# Patient Record
Sex: FEMALE | Race: BLACK OR AFRICAN AMERICAN | NOT HISPANIC OR LATINO | URBAN - METROPOLITAN AREA
[De-identification: names, ages, dates, MRNs, and addresses within clinical notes are randomized per-mention and may not be internally consistent; named-entity substitution may affect disease eponyms.]

---

## 2018-07-16 ENCOUNTER — OUTPATIENT (OUTPATIENT)
Dept: OUTPATIENT SERVICES | Facility: HOSPITAL | Age: 33
LOS: 1 days | End: 2018-07-16

## 2018-07-16 DIAGNOSIS — O26.899 OTHER SPECIFIED PREGNANCY RELATED CONDITIONS, UNSPECIFIED TRIMESTER: ICD-10-CM

## 2018-07-16 DIAGNOSIS — Z3A.00 WEEKS OF GESTATION OF PREGNANCY NOT SPECIFIED: ICD-10-CM

## 2018-07-17 ENCOUNTER — TRANSCRIPTION ENCOUNTER (OUTPATIENT)
Age: 33
End: 2018-07-17

## 2018-07-17 VITALS — WEIGHT: 209.44 LBS | HEIGHT: 65 IN

## 2018-07-17 PROBLEM — Z00.00 ENCOUNTER FOR PREVENTIVE HEALTH EXAMINATION: Status: ACTIVE | Noted: 2018-07-17

## 2018-07-17 LAB
BASOPHILS # BLD AUTO: 0.02 K/UL — SIGNIFICANT CHANGE UP (ref 0–0.2)
BASOPHILS NFR BLD AUTO: 0.3 % — SIGNIFICANT CHANGE UP (ref 0–2)
BLD GP AB SCN SERPL QL: NEGATIVE — SIGNIFICANT CHANGE UP
EOSINOPHIL # BLD AUTO: 0.09 K/UL — SIGNIFICANT CHANGE UP (ref 0–0.5)
EOSINOPHIL NFR BLD AUTO: 1.3 % — SIGNIFICANT CHANGE UP (ref 0–6)
HBV SURFACE AG SER-ACNC: NEGATIVE — SIGNIFICANT CHANGE UP
HCT VFR BLD CALC: 33.9 % — LOW (ref 34.5–45)
HGB BLD-MCNC: 10.9 G/DL — LOW (ref 11.5–15.5)
IMM GRANULOCYTES # BLD AUTO: 0.05 # — SIGNIFICANT CHANGE UP
IMM GRANULOCYTES NFR BLD AUTO: 0.7 % — SIGNIFICANT CHANGE UP (ref 0–1.5)
LYMPHOCYTES # BLD AUTO: 1.57 K/UL — SIGNIFICANT CHANGE UP (ref 1–3.3)
LYMPHOCYTES # BLD AUTO: 22.9 % — SIGNIFICANT CHANGE UP (ref 13–44)
MCHC RBC-ENTMCNC: 27.8 PG — SIGNIFICANT CHANGE UP (ref 27–34)
MCHC RBC-ENTMCNC: 32.2 % — SIGNIFICANT CHANGE UP (ref 32–36)
MCV RBC AUTO: 86.5 FL — SIGNIFICANT CHANGE UP (ref 80–100)
MONOCYTES # BLD AUTO: 0.66 K/UL — SIGNIFICANT CHANGE UP (ref 0–0.9)
MONOCYTES NFR BLD AUTO: 9.6 % — SIGNIFICANT CHANGE UP (ref 2–14)
NEUTROPHILS # BLD AUTO: 4.47 K/UL — SIGNIFICANT CHANGE UP (ref 1.8–7.4)
NEUTROPHILS NFR BLD AUTO: 65.2 % — SIGNIFICANT CHANGE UP (ref 43–77)
NRBC # FLD: 0 — SIGNIFICANT CHANGE UP
PLATELET # BLD AUTO: 154 K/UL — SIGNIFICANT CHANGE UP (ref 150–400)
PMV BLD: 10.8 FL — SIGNIFICANT CHANGE UP (ref 7–13)
RBC # BLD: 3.92 M/UL — SIGNIFICANT CHANGE UP (ref 3.8–5.2)
RBC # FLD: 13.3 % — SIGNIFICANT CHANGE UP (ref 10.3–14.5)
RH IG SCN BLD-IMP: POSITIVE — SIGNIFICANT CHANGE UP
RH IG SCN BLD-IMP: POSITIVE — SIGNIFICANT CHANGE UP
RUBV IGG SER-ACNC: 2.6 INDEX — SIGNIFICANT CHANGE UP
RUBV IGG SER-IMP: POSITIVE — SIGNIFICANT CHANGE UP
T PALLIDUM AB TITR SER: NEGATIVE — SIGNIFICANT CHANGE UP
WBC # BLD: 6.86 K/UL — SIGNIFICANT CHANGE UP (ref 3.8–10.5)
WBC # FLD AUTO: 6.86 K/UL — SIGNIFICANT CHANGE UP (ref 3.8–10.5)

## 2018-07-17 RX ORDER — METOCLOPRAMIDE HCL 10 MG
10 TABLET ORAL ONCE
Qty: 0 | Refills: 0 | Status: DISCONTINUED | OUTPATIENT
Start: 2018-07-17 | End: 2018-07-17

## 2018-07-17 RX ORDER — CITRIC ACID/SODIUM CITRATE 300-500 MG
30 SOLUTION, ORAL ORAL ONCE
Qty: 0 | Refills: 0 | Status: DISCONTINUED | OUTPATIENT
Start: 2018-07-17 | End: 2018-07-17

## 2018-07-17 RX ORDER — FAMOTIDINE 10 MG/ML
20 INJECTION INTRAVENOUS ONCE
Qty: 0 | Refills: 0 | Status: DISCONTINUED | OUTPATIENT
Start: 2018-07-17 | End: 2018-07-17

## 2018-07-17 RX ORDER — SODIUM CHLORIDE 9 MG/ML
1000 INJECTION, SOLUTION INTRAVENOUS ONCE
Qty: 0 | Refills: 0 | Status: DISCONTINUED | OUTPATIENT
Start: 2018-07-17 | End: 2018-07-17

## 2018-07-17 RX ORDER — SODIUM CHLORIDE 9 MG/ML
1000 INJECTION, SOLUTION INTRAVENOUS
Qty: 0 | Refills: 0 | Status: DISCONTINUED | OUTPATIENT
Start: 2018-07-17 | End: 2018-07-17

## 2018-07-17 RX ADMIN — SODIUM CHLORIDE 125 MILLILITER(S): 9 INJECTION, SOLUTION INTRAVENOUS at 05:00

## 2018-07-17 NOTE — DISCHARGE NOTE ANTEPARTUM - PLAN OF CARE
Healthy pregnancy Resume all regular diet and activities as tolerated. Please return to labor and delivery triage area if you have contractions, vaginal bleeding, leakage of fluid, decreased fetal movements, fevers, chills, nausea, vomiting, or any other symptoms that are concerning to you.     Please follow up in the ATU on the 4th floor of the [facility] for an NST and an ultrasound BPP on 7/18/18 @ 1PM.

## 2018-07-17 NOTE — DISCHARGE NOTE ANTEPARTUM - PATIENT PORTAL LINK FT
You can access the [facility] Patient Portal, offered by [facility], by registering with the following website: [email or web address]

## 2018-07-17 NOTE — DISCHARGE NOTE ANTEPARTUM - CARE PROVIDER_API CALL
Tati Sawyer (MD), Obstetrics and Gynecology  [address] [place] [address]  Phone: [phone]  Fax: [phone]

## 2018-07-17 NOTE — DISCHARGE NOTE ANTEPARTUM - CARE PLAN
Principal Discharge DX:	Pregnancy  Goal:	Healthy pregnancy  Assessment and plan of treatment:	Resume all regular diet and activities as tolerated. Please return to labor and delivery triage area if you have contractions, vaginal bleeding, leakage of fluid, decreased fetal movements, fevers, chills, nausea, vomiting, or any other symptoms that are concerning to you.     Please follow up in the ATU on the 4th floor of the [facility] for an NST and an ultrasound BPP on 7/18/18 @ 1PM.

## 2018-07-17 NOTE — DISCHARGE NOTE ANTEPARTUM - HOSPITAL COURSE
Please follow up in the ATU on 7/18/18 @1pm for an ultrasound.    Tracing approved by Dr. Fleischer for discharge.

## 2018-07-18 ENCOUNTER — RESULT REVIEW (OUTPATIENT)
Age: 33
End: 2018-07-18

## 2018-07-18 ENCOUNTER — INPATIENT (INPATIENT)
Facility: HOSPITAL | Age: 33
LOS: 2 days | Discharge: HOME | End: 2018-07-21
Attending: OBSTETRICS & GYNECOLOGY | Admitting: OBSTETRICS & GYNECOLOGY
Payer: COMMERCIAL

## 2018-07-18 ENCOUNTER — APPOINTMENT (OUTPATIENT)
Dept: ANTEPARTUM | Facility: CLINIC | Age: 33
End: 2018-07-18

## 2018-07-18 ENCOUNTER — APPOINTMENT (OUTPATIENT)
Dept: ANTEPARTUM | Facility: HOSPITAL | Age: 33
End: 2018-07-18

## 2018-07-18 VITALS — WEIGHT: 210.32 LBS | HEIGHT: 65 IN

## 2018-07-18 DIAGNOSIS — Z3A.00 WEEKS OF GESTATION OF PREGNANCY NOT SPECIFIED: ICD-10-CM

## 2018-07-18 DIAGNOSIS — O26.899 OTHER SPECIFIED PREGNANCY RELATED CONDITIONS, UNSPECIFIED TRIMESTER: ICD-10-CM

## 2018-07-18 LAB
BASOPHILS # BLD AUTO: 0.03 K/UL — SIGNIFICANT CHANGE UP (ref 0–0.2)
BASOPHILS NFR BLD AUTO: 0.4 % — SIGNIFICANT CHANGE UP (ref 0–2)
BLD GP AB SCN SERPL QL: NEGATIVE — SIGNIFICANT CHANGE UP
EOSINOPHIL # BLD AUTO: 0.05 K/UL — SIGNIFICANT CHANGE UP (ref 0–0.5)
EOSINOPHIL NFR BLD AUTO: 0.6 % — SIGNIFICANT CHANGE UP (ref 0–6)
HCT VFR BLD CALC: 36.1 % — SIGNIFICANT CHANGE UP (ref 34.5–45)
HGB BLD-MCNC: 11.8 G/DL — SIGNIFICANT CHANGE UP (ref 11.5–15.5)
IMM GRANULOCYTES # BLD AUTO: 0.03 # — SIGNIFICANT CHANGE UP
IMM GRANULOCYTES NFR BLD AUTO: 0.4 % — SIGNIFICANT CHANGE UP (ref 0–1.5)
LYMPHOCYTES # BLD AUTO: 1.53 K/UL — SIGNIFICANT CHANGE UP (ref 1–3.3)
LYMPHOCYTES # BLD AUTO: 18 % — SIGNIFICANT CHANGE UP (ref 13–44)
MCHC RBC-ENTMCNC: 28.1 PG — SIGNIFICANT CHANGE UP (ref 27–34)
MCHC RBC-ENTMCNC: 32.7 % — SIGNIFICANT CHANGE UP (ref 32–36)
MCV RBC AUTO: 86 FL — SIGNIFICANT CHANGE UP (ref 80–100)
MONOCYTES # BLD AUTO: 0.75 K/UL — SIGNIFICANT CHANGE UP (ref 0–0.9)
MONOCYTES NFR BLD AUTO: 8.8 % — SIGNIFICANT CHANGE UP (ref 2–14)
NEUTROPHILS # BLD AUTO: 6.13 K/UL — SIGNIFICANT CHANGE UP (ref 1.8–7.4)
NEUTROPHILS NFR BLD AUTO: 71.8 % — SIGNIFICANT CHANGE UP (ref 43–77)
NRBC # FLD: 0 — SIGNIFICANT CHANGE UP
PLATELET # BLD AUTO: 160 K/UL — SIGNIFICANT CHANGE UP (ref 150–400)
PMV BLD: 10.7 FL — SIGNIFICANT CHANGE UP (ref 7–13)
RBC # BLD: 4.2 M/UL — SIGNIFICANT CHANGE UP (ref 3.8–5.2)
RBC # FLD: 13.3 % — SIGNIFICANT CHANGE UP (ref 10.3–14.5)
RH IG SCN BLD-IMP: POSITIVE — SIGNIFICANT CHANGE UP
WBC # BLD: 8.52 K/UL — SIGNIFICANT CHANGE UP (ref 3.8–10.5)
WBC # FLD AUTO: 8.52 K/UL — SIGNIFICANT CHANGE UP (ref 3.8–10.5)

## 2018-07-18 PROCEDURE — 88307 TISSUE EXAM BY PATHOLOGIST: CPT | Mod: 26

## 2018-07-18 RX ORDER — ONDANSETRON 8 MG/1
4 TABLET, FILM COATED ORAL EVERY 6 HOURS
Qty: 0 | Refills: 0 | Status: DISCONTINUED | OUTPATIENT
Start: 2018-07-19 | End: 2018-07-19

## 2018-07-18 RX ORDER — SODIUM CHLORIDE 9 MG/ML
1000 INJECTION, SOLUTION INTRAVENOUS
Qty: 0 | Refills: 0 | Status: DISCONTINUED | OUTPATIENT
Start: 2018-07-18 | End: 2018-07-19

## 2018-07-18 RX ORDER — OXYCODONE HYDROCHLORIDE 5 MG/1
10 TABLET ORAL
Qty: 0 | Refills: 0 | Status: DISCONTINUED | OUTPATIENT
Start: 2018-07-19 | End: 2018-07-19

## 2018-07-18 RX ORDER — BUTORPHANOL TARTRATE 2 MG/ML
0.12 INJECTION, SOLUTION INTRAMUSCULAR; INTRAVENOUS EVERY 6 HOURS
Qty: 0 | Refills: 0 | Status: DISCONTINUED | OUTPATIENT
Start: 2018-07-19 | End: 2018-07-19

## 2018-07-18 RX ORDER — SODIUM CHLORIDE 9 MG/ML
1000 INJECTION, SOLUTION INTRAVENOUS
Qty: 0 | Refills: 0 | Status: DISCONTINUED | OUTPATIENT
Start: 2018-07-18 | End: 2018-07-18

## 2018-07-18 RX ORDER — OXYTOCIN 10 UNIT/ML
333.33 VIAL (ML) INJECTION
Qty: 20 | Refills: 0 | Status: DISCONTINUED | OUTPATIENT
Start: 2018-07-18 | End: 2018-07-19

## 2018-07-18 RX ORDER — HEPARIN SODIUM 5000 [USP'U]/ML
5000 INJECTION INTRAVENOUS; SUBCUTANEOUS EVERY 12 HOURS
Qty: 0 | Refills: 0 | Status: DISCONTINUED | OUTPATIENT
Start: 2018-07-18 | End: 2018-07-21

## 2018-07-18 RX ORDER — OXYCODONE HYDROCHLORIDE 5 MG/1
5 TABLET ORAL EVERY 4 HOURS
Qty: 0 | Refills: 0 | Status: COMPLETED | OUTPATIENT
Start: 2018-07-18 | End: 2018-07-25

## 2018-07-18 RX ORDER — SODIUM CHLORIDE 9 MG/ML
1000 INJECTION, SOLUTION INTRAVENOUS ONCE
Qty: 0 | Refills: 0 | Status: COMPLETED | OUTPATIENT
Start: 2018-07-18 | End: 2018-07-18

## 2018-07-18 RX ORDER — OXYTOCIN 10 UNIT/ML
333.33 VIAL (ML) INJECTION
Qty: 20 | Refills: 0 | Status: COMPLETED | OUTPATIENT
Start: 2018-07-18

## 2018-07-18 RX ORDER — METOCLOPRAMIDE HCL 10 MG
10 TABLET ORAL ONCE
Qty: 0 | Refills: 0 | Status: COMPLETED | OUTPATIENT
Start: 2018-07-18 | End: 2018-07-18

## 2018-07-18 RX ORDER — CITRIC ACID/SODIUM CITRATE 300-500 MG
30 SOLUTION, ORAL ORAL ONCE
Qty: 0 | Refills: 0 | Status: COMPLETED | OUTPATIENT
Start: 2018-07-18 | End: 2018-07-18

## 2018-07-18 RX ORDER — KETOROLAC TROMETHAMINE 30 MG/ML
30 SYRINGE (ML) INJECTION EVERY 6 HOURS
Qty: 0 | Refills: 0 | Status: DISCONTINUED | OUTPATIENT
Start: 2018-07-18 | End: 2018-07-19

## 2018-07-18 RX ORDER — NALOXONE HYDROCHLORIDE 4 MG/.1ML
0.1 SPRAY NASAL
Qty: 0 | Refills: 0 | Status: DISCONTINUED | OUTPATIENT
Start: 2018-07-19 | End: 2018-07-19

## 2018-07-18 RX ORDER — HYDROMORPHONE HYDROCHLORIDE 2 MG/ML
0.5 INJECTION INTRAMUSCULAR; INTRAVENOUS; SUBCUTANEOUS
Qty: 0 | Refills: 0 | Status: DISCONTINUED | OUTPATIENT
Start: 2018-07-19 | End: 2018-07-19

## 2018-07-18 RX ORDER — SODIUM CHLORIDE 9 MG/ML
1000 INJECTION, SOLUTION INTRAVENOUS ONCE
Qty: 0 | Refills: 0 | Status: DISCONTINUED | OUTPATIENT
Start: 2018-07-18 | End: 2018-07-18

## 2018-07-18 RX ORDER — ACETAMINOPHEN 500 MG
975 TABLET ORAL EVERY 6 HOURS
Qty: 0 | Refills: 0 | Status: DISCONTINUED | OUTPATIENT
Start: 2018-07-18 | End: 2018-07-21

## 2018-07-18 RX ORDER — OXYTOCIN 10 UNIT/ML
333.33 VIAL (ML) INJECTION
Qty: 20 | Refills: 0 | Status: DISCONTINUED | OUTPATIENT
Start: 2018-07-18 | End: 2018-07-18

## 2018-07-18 RX ORDER — IBUPROFEN 200 MG
600 TABLET ORAL EVERY 6 HOURS
Qty: 0 | Refills: 0 | Status: COMPLETED | OUTPATIENT
Start: 2018-07-18 | End: 2019-06-16

## 2018-07-18 RX ORDER — OXYCODONE HYDROCHLORIDE 5 MG/1
5 TABLET ORAL
Qty: 0 | Refills: 0 | Status: DISCONTINUED | OUTPATIENT
Start: 2018-07-19 | End: 2018-07-19

## 2018-07-18 RX ORDER — OXYCODONE HYDROCHLORIDE 5 MG/1
5 TABLET ORAL
Qty: 0 | Refills: 0 | Status: COMPLETED | OUTPATIENT
Start: 2018-07-18 | End: 2018-07-25

## 2018-07-18 RX ORDER — FAMOTIDINE 10 MG/ML
20 INJECTION INTRAVENOUS ONCE
Qty: 0 | Refills: 0 | Status: COMPLETED | OUTPATIENT
Start: 2018-07-18 | End: 2018-07-18

## 2018-07-18 RX ADMIN — SODIUM CHLORIDE 2000 MILLILITER(S): 9 INJECTION, SOLUTION INTRAVENOUS at 15:30

## 2018-07-18 RX ADMIN — FAMOTIDINE 20 MILLIGRAM(S): 10 INJECTION INTRAVENOUS at 18:29

## 2018-07-18 RX ADMIN — SODIUM CHLORIDE 250 MILLILITER(S): 9 INJECTION, SOLUTION INTRAVENOUS at 16:13

## 2018-07-18 RX ADMIN — Medication 30 MILLILITER(S): at 18:30

## 2018-07-18 RX ADMIN — Medication 10 MILLIGRAM(S): at 18:30

## 2018-07-19 ENCOUNTER — TRANSCRIPTION ENCOUNTER (OUTPATIENT)
Age: 33
End: 2018-07-19

## 2018-07-19 DIAGNOSIS — Z34.90 ENCOUNTER FOR SUPERVISION OF NORMAL PREGNANCY, UNSPECIFIED, UNSPECIFIED TRIMESTER: ICD-10-CM

## 2018-07-19 DIAGNOSIS — O42.10 PREMATURE RUPTURE OF MEMBRANES, ONSET OF LABOR MORE THAN 24 HOURS FOLLOWING RUPTURE, UNSPECIFIED WEEKS OF GESTATION: ICD-10-CM

## 2018-07-19 LAB
HCT VFR BLD CALC: 28.9 % — LOW (ref 34.5–45)
HGB BLD-MCNC: 9.5 G/DL — LOW (ref 11.5–15.5)
MCHC RBC-ENTMCNC: 28.6 PG — SIGNIFICANT CHANGE UP (ref 27–34)
MCHC RBC-ENTMCNC: 32.9 % — SIGNIFICANT CHANGE UP (ref 32–36)
MCV RBC AUTO: 87 FL — SIGNIFICANT CHANGE UP (ref 80–100)
NRBC # FLD: 0 — SIGNIFICANT CHANGE UP
PLATELET # BLD AUTO: 113 K/UL — LOW (ref 150–400)
PMV BLD: 10.6 FL — SIGNIFICANT CHANGE UP (ref 7–13)
RBC # BLD: 3.32 M/UL — LOW (ref 3.8–5.2)
RBC # FLD: 13.2 % — SIGNIFICANT CHANGE UP (ref 10.3–14.5)
T PALLIDUM AB TITR SER: NEGATIVE — SIGNIFICANT CHANGE UP
WBC # BLD: 10.73 K/UL — HIGH (ref 3.8–10.5)
WBC # FLD AUTO: 10.73 K/UL — HIGH (ref 3.8–10.5)

## 2018-07-19 RX ORDER — SODIUM CHLORIDE 9 MG/ML
1000 INJECTION, SOLUTION INTRAVENOUS
Qty: 0 | Refills: 0 | Status: DISCONTINUED | OUTPATIENT
Start: 2018-07-19 | End: 2018-07-19

## 2018-07-19 RX ORDER — ONDANSETRON 8 MG/1
4 TABLET, FILM COATED ORAL ONCE
Qty: 0 | Refills: 0 | Status: DISCONTINUED | OUTPATIENT
Start: 2018-07-19 | End: 2018-07-19

## 2018-07-19 RX ORDER — IBUPROFEN 200 MG
1 TABLET ORAL
Qty: 0 | Refills: 0 | COMMUNITY
Start: 2018-07-19

## 2018-07-19 RX ORDER — SIMETHICONE 80 MG/1
80 TABLET, CHEWABLE ORAL EVERY 4 HOURS
Qty: 0 | Refills: 0 | Status: DISCONTINUED | OUTPATIENT
Start: 2018-07-19 | End: 2018-07-21

## 2018-07-19 RX ORDER — GLYCERIN ADULT
1 SUPPOSITORY, RECTAL RECTAL AT BEDTIME
Qty: 0 | Refills: 0 | Status: DISCONTINUED | OUTPATIENT
Start: 2018-07-19 | End: 2018-07-21

## 2018-07-19 RX ORDER — HYDROMORPHONE HYDROCHLORIDE 2 MG/ML
0.5 INJECTION INTRAMUSCULAR; INTRAVENOUS; SUBCUTANEOUS
Qty: 0 | Refills: 0 | Status: DISCONTINUED | OUTPATIENT
Start: 2018-07-19 | End: 2018-07-19

## 2018-07-19 RX ORDER — DIPHENHYDRAMINE HCL 50 MG
25 CAPSULE ORAL EVERY 6 HOURS
Qty: 0 | Refills: 0 | Status: DISCONTINUED | OUTPATIENT
Start: 2018-07-19 | End: 2018-07-21

## 2018-07-19 RX ORDER — IBUPROFEN 200 MG
600 TABLET ORAL EVERY 6 HOURS
Qty: 0 | Refills: 0 | Status: DISCONTINUED | OUTPATIENT
Start: 2018-07-19 | End: 2018-07-21

## 2018-07-19 RX ORDER — DOCUSATE SODIUM 100 MG
100 CAPSULE ORAL
Qty: 0 | Refills: 0 | Status: DISCONTINUED | OUTPATIENT
Start: 2018-07-19 | End: 2018-07-21

## 2018-07-19 RX ORDER — DOCUSATE SODIUM 100 MG
100 CAPSULE ORAL
Qty: 0 | Refills: 0 | Status: DISCONTINUED | OUTPATIENT
Start: 2018-07-19 | End: 2018-07-19

## 2018-07-19 RX ORDER — FERROUS SULFATE 325(65) MG
325 TABLET ORAL THREE TIMES A DAY
Qty: 0 | Refills: 0 | Status: DISCONTINUED | OUTPATIENT
Start: 2018-07-19 | End: 2018-07-21

## 2018-07-19 RX ORDER — FERROUS SULFATE 325(65) MG
325 TABLET ORAL DAILY
Qty: 0 | Refills: 0 | Status: DISCONTINUED | OUTPATIENT
Start: 2018-07-19 | End: 2018-07-19

## 2018-07-19 RX ORDER — TETANUS TOXOID, REDUCED DIPHTHERIA TOXOID AND ACELLULAR PERTUSSIS VACCINE, ADSORBED 5; 2.5; 8; 8; 2.5 [IU]/.5ML; [IU]/.5ML; UG/.5ML; UG/.5ML; UG/.5ML
0.5 SUSPENSION INTRAMUSCULAR ONCE
Qty: 0 | Refills: 0 | Status: COMPLETED | OUTPATIENT
Start: 2018-07-19 | End: 2019-06-17

## 2018-07-19 RX ORDER — LANOLIN
1 OINTMENT (GRAM) TOPICAL
Qty: 0 | Refills: 0 | Status: DISCONTINUED | OUTPATIENT
Start: 2018-07-19 | End: 2018-07-21

## 2018-07-19 RX ORDER — OXYTOCIN 10 UNIT/ML
41.67 VIAL (ML) INJECTION
Qty: 20 | Refills: 0 | Status: DISCONTINUED | OUTPATIENT
Start: 2018-07-19 | End: 2018-07-19

## 2018-07-19 RX ORDER — ASCORBIC ACID 60 MG
500 TABLET,CHEWABLE ORAL DAILY
Qty: 0 | Refills: 0 | Status: DISCONTINUED | OUTPATIENT
Start: 2018-07-19 | End: 2018-07-21

## 2018-07-19 RX ADMIN — Medication 30 MILLIGRAM(S): at 01:28

## 2018-07-19 RX ADMIN — Medication 600 MILLIGRAM(S): at 22:10

## 2018-07-19 RX ADMIN — Medication 975 MILLIGRAM(S): at 01:58

## 2018-07-19 RX ADMIN — Medication 500 MILLIGRAM(S): at 18:42

## 2018-07-19 RX ADMIN — Medication 975 MILLIGRAM(S): at 22:10

## 2018-07-19 RX ADMIN — Medication 30 MILLIGRAM(S): at 01:58

## 2018-07-19 RX ADMIN — HEPARIN SODIUM 5000 UNIT(S): 5000 INJECTION INTRAVENOUS; SUBCUTANEOUS at 18:43

## 2018-07-19 RX ADMIN — HEPARIN SODIUM 5000 UNIT(S): 5000 INJECTION INTRAVENOUS; SUBCUTANEOUS at 06:03

## 2018-07-19 RX ADMIN — Medication 100 MILLIGRAM(S): at 18:42

## 2018-07-19 RX ADMIN — Medication 1 TABLET(S): at 18:43

## 2018-07-19 RX ADMIN — Medication 975 MILLIGRAM(S): at 01:28

## 2018-07-19 RX ADMIN — Medication 325 MILLIGRAM(S): at 18:43

## 2018-07-19 RX ADMIN — Medication 600 MILLIGRAM(S): at 21:41

## 2018-07-19 RX ADMIN — Medication 975 MILLIGRAM(S): at 21:41

## 2018-07-19 NOTE — DISCHARGE NOTE OB - CARE PROVIDER_API CALL
Tati Sawyer (MD), Obstetrics and Gynecology  [address] [place] [address]  Phone: [phone]  Fax: [phone]

## 2018-07-19 NOTE — PROGRESS NOTE ADULT - SUBJECTIVE AND OBJECTIVE BOX
R1 OB Event Note    Patient evaluated at bedside at approx 2145 due to postpartum temperature of 38.2 on PPD/POD# 1.  Patient reports no complaints.  Denies HA, blurry vision, URI symptoms, CP/SOB, n/v, cough, abdominal pain, back pain, urinary pain, burning, hesitancy, abnormal vaginal discharge, diarrhea, constipation, lower extremity pain/swelling.  Patient is ambulating, tolerating PO and voiding without difficulty.      Vital Signs Last 24 Hrs  T(C): 38.2 (19 Jul 2018 21:25), Max: 38.2 (19 Jul 2018 21:25)  T(F): 100.8 (19 Jul 2018 21:25), Max: 100.8 (19 Jul 2018 21:25)  HR: 95 (19 Jul 2018 21:25) (76 - 95)  BP: 106/61 (19 Jul 2018 21:25) (93/56 - 127/61)  BP(mean): 65 (19 Jul 2018 00:10) (65 - 65)  RR: 16 (19 Jul 2018 18:45) (16 - 18)  SpO2: 100% (19 Jul 2018 21:25) (98% - 100%)    I&O's Detail    18 Jul 2018 07:01  -  19 Jul 2018 07:00  --------------------------------------------------------  IN:    Lactated Ringers IV Bolus: 1000 mL    lactated ringers.: 375 mL    Other: 1500 mL  Total IN: 2875 mL    OUT:    Estimated Blood Loss: 900 mL    Indwelling Catheter - Urethral: 1875 mL  Total OUT: 2775 mL    Total NET: 100 mL      19 Jul 2018 07:01  -  19 Jul 2018 22:31  --------------------------------------------------------  IN:    lactated ringers.: 1375 mL  Total IN: 1375 mL    OUT:    Indwelling Catheter - Urethral: 3300 mL    Voided: 800 mL  Total OUT: 4100 mL    Total NET: -2725 mL          I&O's Summary    18 Jul 2018 07:01  -  19 Jul 2018 07:00  --------------------------------------------------------  IN: 2875 mL / OUT: 2775 mL / NET: 100 mL    19 Jul 2018 07:01  -  19 Jul 2018 22:31  --------------------------------------------------------  IN: 1375 mL / OUT: 4100 mL / NET: -2725 mL        Physical Exam:   General: Alert, oriented x4. lying comfortably in bed, No acute distress          CV: RR S1S2   Lungs: CTA b/l, good air flow b/l  Breasts: nontender, soft bilaterally  Back: no CVA tenderness  Abdomen: fundus firm, non tender. abdomen non-tender, non-distended.  Incision site +scant serosanguinous drainage noted to steri-strips    Vaginal: lochia wnl   Extremities: non-tender b/l, no edema.                 9.5    10.73 )-----------( 113      ( 07-19 @ 06:00 )             28.9                11.8   8.52  )-----------( 160      ( 07-18 @ 14:00 )             36.1                10.9   6.86  )-----------( 154      ( 07-17 @ 05:25 )             33.9             A/P 32y  POD# 1    w/ post-partum temperature of 38.2.  Patient with no complaints. Patient doing well with no signs of sepsis at this time.       -Tylenol for fever   -Continue to monitor VS   - encourage incentive spirometer use and ambulation    D/w Dr. ROGELIO Patel Chief Resident and Dr. OMA El PGY1  Dr. OMA El PGY1 here to assess patient [id number] R1 OB Event Note    Patient evaluated at bedside at approx 2145 due to postpartum temperature of 38.2 on PPD/POD# 1.  Patient reports no complaints.  Denies HA, blurry vision, URI symptoms, CP/SOB, n/v, cough, abdominal pain, back pain, urinary pain, burning, hesitancy, abnormal vaginal discharge, diarrhea, constipation, lower extremity pain/swelling.  Patient is ambulating, tolerating PO and voiding without difficulty.      Vital Signs Last 24 Hrs  T(C): 38.2 (19 Jul 2018 21:25), Max: 38.2 (19 Jul 2018 21:25)  T(F): 100.8 (19 Jul 2018 21:25), Max: 100.8 (19 Jul 2018 21:25)  HR: 95 (19 Jul 2018 21:25) (76 - 95)  BP: 106/61 (19 Jul 2018 21:25) (93/56 - 127/61)  BP(mean): 65 (19 Jul 2018 00:10) (65 - 65)  RR: 16 (19 Jul 2018 18:45) (16 - 18)  SpO2: 100% (19 Jul 2018 21:25) (98% - 100%)    I&O's Detail    18 Jul 2018 07:01  -  19 Jul 2018 07:00  --------------------------------------------------------  IN:    Lactated Ringers IV Bolus: 1000 mL    lactated ringers.: 375 mL    Other: 1500 mL  Total IN: 2875 mL    OUT:    Estimated Blood Loss: 900 mL    Indwelling Catheter - Urethral: 1875 mL  Total OUT: 2775 mL    Total NET: 100 mL      19 Jul 2018 07:01  -  19 Jul 2018 22:31  --------------------------------------------------------  IN:    lactated ringers.: 1375 mL  Total IN: 1375 mL    OUT:    Indwelling Catheter - Urethral: 3300 mL    Voided: 800 mL  Total OUT: 4100 mL    Total NET: -2725 mL          I&O's Summary    18 Jul 2018 07:01  -  19 Jul 2018 07:00  --------------------------------------------------------  IN: 2875 mL / OUT: 2775 mL / NET: 100 mL    19 Jul 2018 07:01  -  19 Jul 2018 22:31  --------------------------------------------------------  IN: 1375 mL / OUT: 4100 mL / NET: -2725 mL        Physical Exam:   General: Alert, oriented x4. lying comfortably in bed, No acute distress          CV: RR S1S2   Lungs: CTA b/l, good air flow b/l  Breasts: nontender, soft bilaterally  Back: no CVA tenderness  Abdomen: fundus firm, non tender. abdomen non-tender, non-distended.  Incision site +scant serosanguinous drainage noted to steri-strips    Vaginal: lochia wnl   Extremities: non-tender b/l, no edema.                 9.5    10.73 )-----------( 113      ( 07-19 @ 06:00 )             28.9                11.8   8.52  )-----------( 160      ( 07-18 @ 14:00 )             36.1                10.9   6.86  )-----------( 154      ( 07-17 @ 05:25 )             33.9             A/P 32y  POD# 1    w/ post-partum temperature of 38.2.  Patient with no complaints. Patient doing well with no signs of sepsis at this time.       -Tylenol for fever   -Continue to monitor VS   - encourage incentive spirometer use and ambulation    Attempted to notify Primary MD,  unable to reach by phone x3  D/w Dr. ROGELIO Patel Chief Resident and Dr. OMA El PGY1  Dr. OMA El PGY1 here to assess patient [id number] Patient evaluated at bedside at approx 2145 due to postpartum temperature of 38.2 on PPD/POD# 1.  Patient reports no complaints.  Denies HA, blurry vision, URI symptoms, CP/SOB, n/v, cough, abdominal pain, back pain, urinary pain, burning, hesitancy, abnormal vaginal discharge, diarrhea, constipation, lower extremity pain/swelling.  Patient is ambulating, tolerating PO and voiding without difficulty.      Vital Signs Last 24 Hrs  T(C): 38.2 (19 Jul 2018 21:25), Max: 38.2 (19 Jul 2018 21:25)  T(F): 100.8 (19 Jul 2018 21:25), Max: 100.8 (19 Jul 2018 21:25)  HR: 95 (19 Jul 2018 21:25) (76 - 95)  BP: 106/61 (19 Jul 2018 21:25) (93/56 - 127/61)  BP(mean): 65 (19 Jul 2018 00:10) (65 - 65)  RR: 16 (19 Jul 2018 18:45) (16 - 18)  SpO2: 100% (19 Jul 2018 21:25) (98% - 100%)    I&O's Detail    18 Jul 2018 07:01  -  19 Jul 2018 07:00  --------------------------------------------------------  IN:    Lactated Ringers IV Bolus: 1000 mL    lactated ringers.: 375 mL    Other: 1500 mL  Total IN: 2875 mL    OUT:    Estimated Blood Loss: 900 mL    Indwelling Catheter - Urethral: 1875 mL  Total OUT: 2775 mL    Total NET: 100 mL      19 Jul 2018 07:01  -  19 Jul 2018 22:31  --------------------------------------------------------  IN:    lactated ringers.: 1375 mL  Total IN: 1375 mL    OUT:    Indwelling Catheter - Urethral: 3300 mL    Voided: 800 mL  Total OUT: 4100 mL    Total NET: -2725 mL          I&O's Summary    18 Jul 2018 07:01  -  19 Jul 2018 07:00  --------------------------------------------------------  IN: 2875 mL / OUT: 2775 mL / NET: 100 mL    19 Jul 2018 07:01  -  19 Jul 2018 22:31  --------------------------------------------------------  IN: 1375 mL / OUT: 4100 mL / NET: -2725 mL        Physical Exam:   General: Alert, oriented x4. lying comfortably in bed, No acute distress          CV: RR S1S2   Lungs: CTA b/l, good air flow b/l  Breasts: nontender, soft bilaterally  Back: no CVA tenderness  Abdomen: fundus firm, non tender. abdomen non-tender, non-distended.  Incision site +scant serosanguinous drainage noted to steri-strips    Vaginal: lochia wnl   Extremities: non-tender b/l, no edema.                 9.5    10.73 )-----------( 113      ( 07-19 @ 06:00 )             28.9                11.8   8.52  )-----------( 160      ( 07-18 @ 14:00 )             36.1                10.9   6.86  )-----------( 154      ( 07-17 @ 05:25 )             33.9             A/P 32y  POD# 1    w/ post-partum temperature of 38.2.  Patient with no complaints. Patient doing well with no signs of sepsis at this time.       -Tylenol for fever   -Continue to monitor VS   - encourage incentive spirometer use and ambulation    Attempted to notify Primary MD,  unable to reach by phone x3  D/w Dr. ROGELIO Patel Chief Resident and Dr. OMA El PGY1  Dr. OMA El PGY1 here to assess patient [id number]

## 2018-07-19 NOTE — DISCHARGE NOTE OB - PATIENT PORTAL LINK FT
You can access the [facility] Patient Portal, offered by [facility], by registering with the following website: [email or web address]

## 2018-07-19 NOTE — CHART NOTE - NSCHARTNOTEFT_GEN_A_CORE
OB Postpartum Note: Repeat  Delivery, POD#1     S: 31yo POD#1 s/p rLTCS. Recent temp at 21:30 38.2. Her pain is well controlled. She is tolerating a regular diet and passing flatus. Denies N/V. Denies CP/SOB/lightheadedness/dizziness.    Ambulating without difficulty.  Indwelling catheter was removed and patient is voiding independently.   O:   Vitals:  Vital Signs Last 24 Hrs  T(C): 38.2 (2018 21:25), Max: 38.2 (2018 21:25)  T(F): 100.8 (2018 21:25), Max: 100.8 (2018 21:25)  HR: 95 (2018 21:25) (76 - 95)  BP: 106/61 (2018 21:25) (93/56 - 127/61)  BP(mean): 65 (2018 00:10) (65 - 65)  RR: 16 (2018 18:45) (16 - 18)  SpO2: 100% (2018 21:25) (98% - 100%)    MEDICATIONS  (STANDING):  acetaminophen   Tablet. 975 milliGRAM(s) Oral every 6 hours  ascorbic acid 500 milliGRAM(s) Oral daily  diphtheria/tetanus/pertussis (acellular) Vaccine (ADAcel) 0.5 milliLiter(s) IntraMuscular once  docusate sodium 100 milliGRAM(s) Oral two times a day  ferrous    sulfate 325 milliGRAM(s) Oral three times a day  heparin  Injectable 5000 Unit(s) SubCutaneous every 12 hours  ibuprofen  Tablet 600 milliGRAM(s) Oral every 6 hours  oxyCODONE    IR 5 milliGRAM(s) Oral every 3 hours  prenatal multivitamin 1 Tablet(s) Oral daily    MEDICATIONS  (PRN):  diphenhydrAMINE   Capsule 25 milliGRAM(s) Oral every 6 hours PRN Itching  glycerin Suppository - Adult 1 Suppository(s) Rectal at bedtime PRN Constipation  lanolin Ointment 1 Application(s) Topical every 3 hours PRN Sore Nipples  oxyCODONE    IR 5 milliGRAM(s) Oral every 4 hours PRN Severe Pain (7 - 10)  simethicone 80 milliGRAM(s) Chew every 4 hours PRN Gas      Labs:  Blood type: O Positive  Rubella IgG: Positive ( @ 05:25)  RPR: Negative                          9.5<L>   10.73<H> >-----------< 113<L>    (  @ 06:00 )             28.9<L>                        11.8   8.52 >-----------< 160    (  @ 14:00 )             36.1                        10.9<L>   6.86 >-----------< 154    (  @ 05:25 )             33.9<L>                  PE:  General: NAD  Heart: S1 S2 RRR  Chest: lungs CTAB. Breasts non-tender, non-erythematous.  Abdomen: mildly distended ,appropriately tender, incision c/d/i. No rebound or guarding  Extremities: no new swelling or erythema    A/P: 31yo POD#1 s/p rLTCS.  Patient is stable and doing well post-operatively with a temp 38.2 likely 2/2 breast engorgement.    - monitor vitals regularly; if isolated temp, likely breast engorgement. If fever persists, start abx.  - Continue regular diet  - Increase ambulation.  - Tylenol PRN  - stat CBC to monitor white count      Etelvina El PGY1  d/w Dr. Patel and to be d/w Dr. Sawyer

## 2018-07-19 NOTE — DISCHARGE NOTE OB - SECONDARY DIAGNOSIS.
Uterine scar from previous  delivery Meconium in amniotic fluid True knot of umbilical cord, single or unspecified fetus

## 2018-07-19 NOTE — PROVIDER CONTACT NOTE (OTHER) - SITUATION
at bedside patient complaining of dizziness, orthostatic BP lying 93/53, HR 69, sitting BP 89/60, HR 76, standing, BP 91/54, HR 73

## 2018-07-19 NOTE — DISCHARGE NOTE OB - CARE PLAN
Principal Discharge DX:	Term pregnancy, repeat  Goal:	normal postoperative course  Assessment and plan of treatment:	nothing in vagina, no heavy lifting  Secondary Diagnosis:	Uterine scar from previous  delivery  Secondary Diagnosis:	Meconium in amniotic fluid  Secondary Diagnosis:	True knot of umbilical cord, single or unspecified fetus

## 2018-07-19 NOTE — PROVIDER CONTACT NOTE (OTHER) - ACTION/TREATMENT ORDERED:
at bedside on orders given, continue to monitor.
patient was seen at bedside by SHELBY Mitchell and MD El, continue to monitor

## 2018-07-19 NOTE — LACTATION INITIAL EVALUATION - LACTATION INTERVENTIONS
initiate skin to skin/Instructed and assisted with latch and positioning.  New Beginnings book and feeding log reviewed.  Primary RN made aware of possible tight lingual frenulum.  RN to have MD evaluate infant oral cavity.  Encouraged to feed on cue at least 8-12 times in 24 hours.  Questions answered .  Support given./initiate hand expression routine

## 2018-07-19 NOTE — PROVIDER CONTACT NOTE (OTHER) - ASSESSMENT
Heart S1 S2, clear bilateral breath sounds, fundus firm at umbilicus, light lochia rubra
patient asymptomatic, OOB, walking, pain medication given PRN

## 2018-07-19 NOTE — PROGRESS NOTE ADULT - SUBJECTIVE AND OBJECTIVE BOX
Postpartum Note,  Section  She is a  32y woman who is now post-operative day: 1    Subjective:  The patient feels dizzy when ambulating  She is ambulating.   She is tolerating regular diet.  She denies nausea and vomiting.  She is voiding.  Her pain is controlled.  She reports normal postpartum bleeding.  She is breastfeeding.  .    Physical exam:    Vital Signs Last 24 Hrs  T(C): 36.7 (2018 05:46), Max: 37.6 (2018 00:10)  T(F): 98.1 (2018 05:46), Max: 99.7 (2018 00:10)  HR: 80 (2018 05:46) (76 - 97)  BP: 98/57 (2018 05:46) (85/71 - 112/63)  BP(mean): 65 (2018 00:10) (44 - 74)  RR: 18 (2018 05:46) (14 - 23)  SpO2: 99% (2018 05:46) (96% - 100%)    Gen: NAD  Breast: Soft, nontender, not engorged.  Abdomen: Soft, nontender, no distension , firm uterine fundus at umbilicus.  Incision: Clean, dry, and intact with steri strips  Pelvic: Normal lochia noted  Ext: No calf tenderness    LABS:                        9.5    10.73 )-----------( 113      ( 2018 06:00 )             28.9     ABO Interpretation: O ( @ 14:12)  Rh Interpretation: Positive ( @ 14:12)  Antibody Screen: Negative ( @ 14:12)    Rubella status:     Allergies    No Known Allergies    Intolerances      MEDICATIONS  (STANDING):  acetaminophen   Tablet. 975 milliGRAM(s) Oral every 6 hours  diphtheria/tetanus/pertussis (acellular) Vaccine (ADAcel) 0.5 milliLiter(s) IntraMuscular once  ferrous    sulfate 325 milliGRAM(s) Oral daily  heparin  Injectable 5000 Unit(s) SubCutaneous every 12 hours  ibuprofen  Tablet 600 milliGRAM(s) Oral every 6 hours  ketorolac   Injectable 30 milliGRAM(s) IV Push every 6 hours  lactated ringers. 1000 milliLiter(s) (125 mL/Hr) IV Continuous <Continuous>  oxyCODONE    IR 5 milliGRAM(s) Oral every 3 hours  oxytocin Infusion 41.667 milliUNIT(s)/Min (125 mL/Hr) IV Continuous <Continuous>  prenatal multivitamin 1 Tablet(s) Oral daily    MEDICATIONS  (PRN):  butorphanol Injectable 0.125 milliGRAM(s) IV Push every 6 hours PRN Pruritus  diphenhydrAMINE   Capsule 25 milliGRAM(s) Oral every 6 hours PRN Itching  docusate sodium 100 milliGRAM(s) Oral two times a day PRN Stool Softening  glycerin Suppository - Adult 1 Suppository(s) Rectal at bedtime PRN Constipation  HYDROmorphone  Injectable 0.5 milliGRAM(s) IV Push every 3 hours PRN Severe Pain  lanolin Ointment 1 Application(s) Topical every 3 hours PRN Sore Nipples  naloxone Injectable 0.1 milliGRAM(s) IV Push every 3 minutes PRN For ANY of the following changes in patient status:  A. RR LESS THAN 10 breaths per minute, B. Oxygen saturation LESS THAN 90%, C. Sedation score of 6  ondansetron Injectable 4 milliGRAM(s) IV Push every 6 hours PRN Nausea  oxyCODONE    IR 5 milliGRAM(s) Oral every 3 hours PRN Mild Pain  oxyCODONE    IR 10 milliGRAM(s) Oral every 3 hours PRN Moderate Pain  oxyCODONE    IR 5 milliGRAM(s) Oral every 4 hours PRN Severe Pain (7 - 10)  simethicone 80 milliGRAM(s) Chew every 4 hours PRN Gas        Assessment and Plan  POD # 1  s/p repeat  section  Doing well.  Encourage ambulation.  Continue routine post op care.

## 2018-07-19 NOTE — PROGRESS NOTE ADULT - SUBJECTIVE AND OBJECTIVE BOX
OB Progress Note:  Delivery, POD#1    S: 33yo POD#1 s/p rLTCS . Complicated by uterine window/dehiscence. Her pain is well controlled. She is tolerating a regular diet and passing flatus. Denies N/V. Feels lightheaded this morning. Denies CP/SOB.   She is ambulating without difficulty.     O:   Vital Signs Last 24 Hrs  T(C): 36.7 (2018 05:46), Max: 37.6 (2018 00:10)  T(F): 98.1 (2018 05:46), Max: 99.7 (2018 00:10)  HR: 80 (2018 05:46) (76 - 97)  BP: 98/57 (2018 05:46) (85/71 - 112/63)  BP(mean): 65 (2018 00:10) (44 - 74)  RR: 18 (2018 05:46) (14 - 23)  SpO2: 99% (2018 05:46) (96% - 100%)      Orthostatics   lying 93/53   HR 69  sitting 89/60   HR 76  standing 91/54   HR 73     Labs:  Blood type: O Positive  Rubella IgG: Positive ( @ 05:25)  RPR: Negative                          9.5<L>   10.73<H> >-----------< 113<L>    (  @ 06:00 )             28.9<L>                        11.8   8.52 >-----------< 160    (  @ 14:00 )             36.1                        10.9<L>   6.86 >-----------< 154    (  @ 05:25 )             33.9<L>      PE:  General: NAD  Abdomen: Mildly distended, appropriately tender, incision c/d/i.  Extremities: No erythema, no pitting edema    A/P: 33yo POD#1 s/p rLTCS.  Patient is stable and doing well post-operatively.    - Orthostatics neg. Hct 36->28.9 appropriate drop   - Continue regular diet.  - Increase ambulation.  - Continue motrin, tylenol, oxycodone PRN for pain control    - F/u AM CBC      Esra Demirel, PGY1

## 2018-07-19 NOTE — DISCHARGE NOTE OB - MEDICATION SUMMARY - MEDICATIONS TO TAKE
I will START or STAY ON the medications listed below when I get home from the hospital:    ibuprofen 600 mg oral tablet  -- 1 tab(s) by mouth every 6 hours, As Needed  -- Indication: For Pain    Prenatabs Rx oral tablet  -- 1 tab(s) by mouth once a day  -- Indication: For vitamins

## 2018-07-20 DIAGNOSIS — R50.82 POSTPROCEDURAL FEVER: ICD-10-CM

## 2018-07-20 LAB
HCT VFR BLD CALC: 27.8 % — LOW (ref 34.5–45)
HGB BLD-MCNC: 9 G/DL — LOW (ref 11.5–15.5)
MCHC RBC-ENTMCNC: 28.1 PG — SIGNIFICANT CHANGE UP (ref 27–34)
MCHC RBC-ENTMCNC: 32.4 % — SIGNIFICANT CHANGE UP (ref 32–36)
MCV RBC AUTO: 86.9 FL — SIGNIFICANT CHANGE UP (ref 80–100)
NRBC # FLD: 0 — SIGNIFICANT CHANGE UP
PLATELET # BLD AUTO: 131 K/UL — LOW (ref 150–400)
PMV BLD: 10.6 FL — SIGNIFICANT CHANGE UP (ref 7–13)
RBC # BLD: 3.2 M/UL — LOW (ref 3.8–5.2)
RBC # FLD: 13.2 % — SIGNIFICANT CHANGE UP (ref 10.3–14.5)
WBC # BLD: 10.89 K/UL — HIGH (ref 3.8–10.5)
WBC # FLD AUTO: 10.89 K/UL — HIGH (ref 3.8–10.5)

## 2018-07-20 RX ORDER — OXYCODONE HYDROCHLORIDE 5 MG/1
5 TABLET ORAL EVERY 4 HOURS
Qty: 0 | Refills: 0 | Status: DISCONTINUED | OUTPATIENT
Start: 2018-07-20 | End: 2018-07-21

## 2018-07-20 RX ORDER — OXYCODONE HYDROCHLORIDE 5 MG/1
5 TABLET ORAL
Qty: 0 | Refills: 0 | Status: DISCONTINUED | OUTPATIENT
Start: 2018-07-20 | End: 2018-07-21

## 2018-07-20 RX ORDER — TETANUS TOXOID, REDUCED DIPHTHERIA TOXOID AND ACELLULAR PERTUSSIS VACCINE, ADSORBED 5; 2.5; 8; 8; 2.5 [IU]/.5ML; [IU]/.5ML; UG/.5ML; UG/.5ML; UG/.5ML
0.5 SUSPENSION INTRAMUSCULAR ONCE
Qty: 0 | Refills: 0 | Status: COMPLETED | OUTPATIENT
Start: 2018-07-20 | End: 2018-07-20

## 2018-07-20 RX ADMIN — Medication 975 MILLIGRAM(S): at 13:00

## 2018-07-20 RX ADMIN — Medication 600 MILLIGRAM(S): at 12:04

## 2018-07-20 RX ADMIN — Medication 600 MILLIGRAM(S): at 06:02

## 2018-07-20 RX ADMIN — Medication 600 MILLIGRAM(S): at 18:08

## 2018-07-20 RX ADMIN — Medication 975 MILLIGRAM(S): at 18:08

## 2018-07-20 RX ADMIN — Medication 325 MILLIGRAM(S): at 18:09

## 2018-07-20 RX ADMIN — Medication 975 MILLIGRAM(S): at 06:50

## 2018-07-20 RX ADMIN — Medication 1 TABLET(S): at 12:04

## 2018-07-20 RX ADMIN — Medication 600 MILLIGRAM(S): at 06:50

## 2018-07-20 RX ADMIN — Medication 100 MILLIGRAM(S): at 18:07

## 2018-07-20 RX ADMIN — HEPARIN SODIUM 5000 UNIT(S): 5000 INJECTION INTRAVENOUS; SUBCUTANEOUS at 06:02

## 2018-07-20 RX ADMIN — Medication 975 MILLIGRAM(S): at 12:04

## 2018-07-20 RX ADMIN — Medication 600 MILLIGRAM(S): at 13:00

## 2018-07-20 RX ADMIN — HEPARIN SODIUM 5000 UNIT(S): 5000 INJECTION INTRAVENOUS; SUBCUTANEOUS at 18:07

## 2018-07-20 RX ADMIN — Medication 325 MILLIGRAM(S): at 06:02

## 2018-07-20 RX ADMIN — Medication 975 MILLIGRAM(S): at 06:02

## 2018-07-20 RX ADMIN — Medication 500 MILLIGRAM(S): at 12:04

## 2018-07-20 RX ADMIN — TETANUS TOXOID, REDUCED DIPHTHERIA TOXOID AND ACELLULAR PERTUSSIS VACCINE, ADSORBED 0.5 MILLILITER(S): 5; 2.5; 8; 8; 2.5 SUSPENSION INTRAMUSCULAR at 22:00

## 2018-07-20 RX ADMIN — Medication 100 MILLIGRAM(S): at 06:02

## 2018-07-20 RX ADMIN — Medication 325 MILLIGRAM(S): at 12:04

## 2018-07-20 NOTE — PROGRESS NOTE ADULT - SUBJECTIVE AND OBJECTIVE BOX
OB Progress Note:  Delivery, POD#2    S: 33yo POD#2 s/p rLTCS complicated by PPT (, 930p). Her pain is well controlled. She is tolerating a regular diet and passing flatus. Denies N/V. Denies CP/SOB/lightheadedness/dizziness. Denies fevers, chills.  She is ambulating without difficulty.   Voiding spontanously.     O:   Vital Signs Last 24 Hrs  T(C): 36.7 (2018 05:41), Max: 38.2 (2018 21:25)  T(F): 98 (2018 05:41), Max: 100.8 (2018 21:25)  HR: 87 (2018 05:41) (87 - 95)  BP: 98/54 (2018 05:41) (98/54 - 127/61)  BP(mean): --  RR: 18 (2018 05:41) (16 - 18)  SpO2: 99% (2018 05:41) (99% - 100%)    Labs:  Blood type: O Positive  Rubella IgG: Positive ( @ 05:25)  RPR: Negative                          9.0<L>   10.89<H> >-----------< 131<L>    (  @ 00:50 )             27.8<L>                        9.5<L>   10.73<H> >-----------< 113<L>    (  @ 06:00 )             28.9<L>                        11.8   8.52 >-----------< 160    (  @ 14:00 )             36.1                  PE:  General: NAD  Abdomen: Mildly distended, appropriately tender, incision c/d/i.  Extremities: No erythema, no pitting edema (1+ bilaterally)  Pelvic:  moderate lochia

## 2018-07-20 NOTE — PROGRESS NOTE ADULT - PROBLEM SELECTOR PLAN 2
-Fever overnight, currently afebrile  -Continue to monitor T  -If febrile, will start ABX per attending    Frida Mathew PGY-1

## 2018-07-21 VITALS
HEART RATE: 87 BPM | OXYGEN SATURATION: 99 % | DIASTOLIC BLOOD PRESSURE: 57 MMHG | TEMPERATURE: 99 F | SYSTOLIC BLOOD PRESSURE: 101 MMHG | RESPIRATION RATE: 18 BRPM

## 2018-07-21 RX ADMIN — Medication 975 MILLIGRAM(S): at 17:00

## 2018-07-21 RX ADMIN — Medication 975 MILLIGRAM(S): at 00:30

## 2018-07-21 RX ADMIN — Medication 100 MILLIGRAM(S): at 18:30

## 2018-07-21 RX ADMIN — Medication 600 MILLIGRAM(S): at 00:30

## 2018-07-21 RX ADMIN — Medication 600 MILLIGRAM(S): at 01:00

## 2018-07-21 RX ADMIN — Medication 600 MILLIGRAM(S): at 17:00

## 2018-07-21 RX ADMIN — OXYCODONE HYDROCHLORIDE 5 MILLIGRAM(S): 5 TABLET ORAL at 17:00

## 2018-07-21 RX ADMIN — Medication 600 MILLIGRAM(S): at 16:00

## 2018-07-21 RX ADMIN — Medication 100 MILLIGRAM(S): at 06:11

## 2018-07-21 RX ADMIN — Medication 600 MILLIGRAM(S): at 06:43

## 2018-07-21 RX ADMIN — HEPARIN SODIUM 5000 UNIT(S): 5000 INJECTION INTRAVENOUS; SUBCUTANEOUS at 06:11

## 2018-07-21 RX ADMIN — Medication 975 MILLIGRAM(S): at 01:00

## 2018-07-21 RX ADMIN — Medication 325 MILLIGRAM(S): at 16:22

## 2018-07-21 RX ADMIN — Medication 1 TABLET(S): at 12:54

## 2018-07-21 RX ADMIN — Medication 600 MILLIGRAM(S): at 06:11

## 2018-07-21 RX ADMIN — SIMETHICONE 80 MILLIGRAM(S): 80 TABLET, CHEWABLE ORAL at 13:00

## 2018-07-21 RX ADMIN — Medication 325 MILLIGRAM(S): at 06:11

## 2018-07-21 RX ADMIN — Medication 975 MILLIGRAM(S): at 06:11

## 2018-07-21 RX ADMIN — Medication 975 MILLIGRAM(S): at 16:00

## 2018-07-21 RX ADMIN — Medication 975 MILLIGRAM(S): at 06:43

## 2018-07-21 RX ADMIN — OXYCODONE HYDROCHLORIDE 5 MILLIGRAM(S): 5 TABLET ORAL at 16:00

## 2018-07-21 RX ADMIN — Medication 500 MILLIGRAM(S): at 12:53

## 2018-07-21 NOTE — PROGRESS NOTE ADULT - SUBJECTIVE AND OBJECTIVE BOX
Postpartum Note,  Section  She is a  32y woman who is now post-operative day: 3    Subjective:  The patient feels well.  She is ambulating.   She is tolerating regular diet.  She denies nausea and vomiting.  She is voiding.  Her pain is controlled.  She reports normal postpartum bleeding.  She is breastfeeding.  She is formula feeding.    Physical exam:    Vital Signs Last 24 Hrs  T(C): 37 (2018 05:33), Max: 37.2 (2018 18:00)  T(F): 98.6 (2018 05:33), Max: 98.9 (2018 18:00)  HR: 87 (2018 05:33) (87 - 88)  BP: 101/57 (2018 05:33) (97/55 - 101/57)  BP(mean): --  RR: 18 (2018 05:33) (16 - 18)  SpO2: 99% (2018 05:33) (99% - 99%)    I&O's Summary      Gen: NAD  Breast: Soft, nontender, not engorged.  Abdomen: Soft, nontender, no distension , firm uterine fundus at umbilicus.  Incision: Clean, dry, and intact with steri strips  Pelvic: Normal lochia noted  Ext: No calf tenderness    LABS:                        9.0    10.89 )-----------( 131      ( 2018 00:50 )             27.8       Rubella status:     Allergies    No Known Allergies    Intolerances      MEDICATIONS  (STANDING):  acetaminophen   Tablet. 975 milliGRAM(s) Oral every 6 hours  ascorbic acid 500 milliGRAM(s) Oral daily  docusate sodium 100 milliGRAM(s) Oral two times a day  ferrous    sulfate 325 milliGRAM(s) Oral three times a day  heparin  Injectable 5000 Unit(s) SubCutaneous every 12 hours  ibuprofen  Tablet 600 milliGRAM(s) Oral every 6 hours  oxyCODONE    IR 5 milliGRAM(s) Oral every 3 hours  prenatal multivitamin 1 Tablet(s) Oral daily    MEDICATIONS  (PRN):  diphenhydrAMINE   Capsule 25 milliGRAM(s) Oral every 6 hours PRN Itching  glycerin Suppository - Adult 1 Suppository(s) Rectal at bedtime PRN Constipation  lanolin Ointment 1 Application(s) Topical every 3 hours PRN Sore Nipples  oxyCODONE    IR 5 milliGRAM(s) Oral every 4 hours PRN Severe Pain (7 - 10)  simethicone 80 milliGRAM(s) Chew every 4 hours PRN Gas        Assessment and Plan  POD #3 s/p  section  Doing well.  Encourage ambulation.  Regular diet   post op care told   Discharge home today

## 2018-07-28 LAB — SURGICAL PATHOLOGY STUDY: SIGNIFICANT CHANGE UP

## 2018-08-01 ENCOUNTER — TRANSCRIPTION ENCOUNTER (OUTPATIENT)
Age: 33
End: 2018-08-01

## 2019-09-09 NOTE — LACTATION INITIAL EVALUATION - INTERVENTION OUTCOME
[de-identified] : Constitutional: Well-nourished, well-developed, No acute distress\par Respiratory:  Good respiratory effort, no SOB\par Lymphatic: No regional lymphadenopathy, no lymphedema\par Psychiatric: Pleasant and normal affect, alert and oriented x3\par Skin: Clean dry and intact B/L UE/LE\par Musculoskeletal: normal except where as noted in regional exam\par \par \par Right Shoulder:\par APPEARANCE: no marked deformities, no swelling or malalignment\par POSITIVE TENDERNESS: none\par NONTENDER: supraspinatus, infraspinatus, teres minor, LH biceps, anterior and posterior capsule, AC joint\par ROM: full & painless, no scapular winging or dyskinesia present\par RESISTIVE TESTING: painless 5/5 resisted flex/ext, empty can/ER/IR, horizontal abd/add \par SPECIAL TESTS: neg Drop Arm, neg Empty Can, neg Mckeon/Neers, neg Branch's, neg Speeds, neg Apprehension, neg cross arm adduction, neg apley's scratch test\par Vasc: 2+ radial pulse\par Neuro: AIN, PIN, Ulnar nerve intact to motor, DTRs 2+/4 biceps, triceps, brachioradialis\par Sensation: Intact to light touch throughout\par B/L Elbows:  No asymmetry, malalignment, or swelling, Full ROM, 5/5 strength in flexion/ext, pronation/supination, Joints stable\par B/L Wrist and Hand:  No asymmetry, malalignment, or swelling, Full ROM, 5/5 strength in wrist and long finger flexion/ext, radial/ulnar deviation, Joints stable\par \par Left Shoulder:\par APPEARANCE: no marked deformities, no swelling or malalignment\par POSITIVE TENDERNESS: supraspinatus, long head biceps tendon\par NONTENDER:  infraspinatus, teres minor. biceps. anterior and posterior capsule. AC joint. \par ROM: full with mild painful arc past 60 degrees, no scapular winging or dyskinesia present\par RESISTIVE TESTING: MMT 4+/5 ER, Flexion and Empty can, 5/5 IR. painless 5/5 resisted ext, horizontal abd/add \par SPECIAL TESTS: + Mckeon and Neers, mildly + cross arm adduction, + Speeds, neg Branch's, neg Drop Arm, neg Apprehension. neg apley's scratch test\par Vasc: 2+ radial pulse\par Neuro: AIN, PIN, Ulnar nerve intact to motor, DTRs 2+/4 biceps, triceps, brachioradialis\par Sensation: Intact to light touch throughout\par 
verbalizes understanding/good return demonstration/demonstrates understanding of teaching

## 2019-10-28 NOTE — PROGRESS NOTE ADULT - PROBLEM SELECTOR PLAN 1
- Continue regular diet.  - Increase ambulation.  - Continue motrin, tylenol, oxycodone PRN for pain control.
OOB with assistance  incentive spirometer.  D/C rogers as ordered.  regular diet
4 = completely dependent

## 2021-08-03 NOTE — DISCHARGE NOTE ANTEPARTUM - PROVIDER TOKENS
Please return the call.  She is home.    Mary Villagran on 8/3/2021 at 4:22 PM   FABIOLA:'[id number]'

## 2021-11-08 NOTE — DISCHARGE NOTE OB - MATERIALS PROVIDED
Patient notified.   Immunization Record/Breastfeeding Log/Back To Sleep Handout/[facility] Hearing Screen Program/Vaccinations/Breastfeeding Mother’s Support Group Information/Guide to Postpartum Care/Breastfeeding Guide and Packet/Birth Certificate Instructions/[facility] [place] Screening Program/Shaken Baby Prevention Handout/Discharge Medication Information for Patients and Families Pocket Guide